# Patient Record
Sex: FEMALE | Race: WHITE | Employment: FULL TIME | URBAN - METROPOLITAN AREA
[De-identification: names, ages, dates, MRNs, and addresses within clinical notes are randomized per-mention and may not be internally consistent; named-entity substitution may affect disease eponyms.]

---

## 2018-05-29 ENCOUNTER — OFFICE VISIT (OUTPATIENT)
Dept: INTERNAL MEDICINE CLINIC | Age: 63
End: 2018-05-29

## 2018-05-29 VITALS
SYSTOLIC BLOOD PRESSURE: 138 MMHG | BODY MASS INDEX: 31.52 KG/M2 | TEMPERATURE: 97.7 F | OXYGEN SATURATION: 99 % | DIASTOLIC BLOOD PRESSURE: 88 MMHG | HEART RATE: 86 BPM | HEIGHT: 67 IN | WEIGHT: 200.8 LBS

## 2018-05-29 DIAGNOSIS — N39.0 URINARY TRACT INFECTION WITH HEMATURIA, SITE UNSPECIFIED: ICD-10-CM

## 2018-05-29 DIAGNOSIS — R30.0 DYSURIA: Primary | ICD-10-CM

## 2018-05-29 DIAGNOSIS — R31.9 URINARY TRACT INFECTION WITH HEMATURIA, SITE UNSPECIFIED: ICD-10-CM

## 2018-05-29 LAB
BILIRUBIN, POC: NORMAL
BLOOD URINE, POC: NORMAL
CLARITY, POC: NORMAL
COLOR, POC: YELLOW
GLUCOSE URINE, POC: NORMAL
KETONES, POC: NORMAL
LEUKOCYTE EST, POC: NORMAL
NITRITE, POC: NORMAL
PH, POC: 6
PROTEIN, POC: NORMAL
SPECIFIC GRAVITY, POC: 1.01
UROBILINOGEN, POC: 0.2

## 2018-05-29 PROCEDURE — 81002 URINALYSIS NONAUTO W/O SCOPE: CPT | Performed by: NURSE PRACTITIONER

## 2018-05-29 PROCEDURE — 99213 OFFICE O/P EST LOW 20 MIN: CPT | Performed by: NURSE PRACTITIONER

## 2018-05-29 RX ORDER — PHENAZOPYRIDINE HYDROCHLORIDE 100 MG/1
100 TABLET, FILM COATED ORAL 3 TIMES DAILY PRN
Qty: 9 TABLET | Refills: 0 | Status: SHIPPED | OUTPATIENT
Start: 2018-05-29 | End: 2019-05-29

## 2018-05-29 RX ORDER — CIPROFLOXACIN 500 MG/1
500 TABLET, FILM COATED ORAL 2 TIMES DAILY
Qty: 14 TABLET | Refills: 0 | Status: SHIPPED | OUTPATIENT
Start: 2018-05-29 | End: 2018-06-05

## 2018-05-29 ASSESSMENT — PATIENT HEALTH QUESTIONNAIRE - PHQ9
1. LITTLE INTEREST OR PLEASURE IN DOING THINGS: 0
SUM OF ALL RESPONSES TO PHQ9 QUESTIONS 1 & 2: 0
2. FEELING DOWN, DEPRESSED OR HOPELESS: 0
SUM OF ALL RESPONSES TO PHQ QUESTIONS 1-9: 0

## 2018-05-29 ASSESSMENT — ENCOUNTER SYMPTOMS
NAUSEA: 0
DIARRHEA: 0
ABDOMINAL PAIN: 0
COUGH: 0
SHORTNESS OF BREATH: 0
VOMITING: 0

## 2018-05-31 ENCOUNTER — TELEPHONE (OUTPATIENT)
Dept: INTERNAL MEDICINE CLINIC | Age: 63
End: 2018-05-31

## 2018-05-31 LAB
ORGANISM: ABNORMAL
URINE CULTURE, ROUTINE: ABNORMAL
URINE CULTURE, ROUTINE: ABNORMAL

## 2021-11-15 ENCOUNTER — OFFICE VISIT (OUTPATIENT)
Dept: FAMILY MEDICINE CLINIC | Age: 66
End: 2021-11-15
Payer: COMMERCIAL

## 2021-11-15 VITALS
OXYGEN SATURATION: 100 % | DIASTOLIC BLOOD PRESSURE: 80 MMHG | HEART RATE: 67 BPM | SYSTOLIC BLOOD PRESSURE: 138 MMHG | WEIGHT: 201 LBS | TEMPERATURE: 98.9 F | BODY MASS INDEX: 31.55 KG/M2 | HEIGHT: 67 IN

## 2021-11-15 DIAGNOSIS — E04.1 THYROID NODULE: ICD-10-CM

## 2021-11-15 DIAGNOSIS — R05.9 COUGH: ICD-10-CM

## 2021-11-15 DIAGNOSIS — Z12.11 SCREENING FOR COLON CANCER: ICD-10-CM

## 2021-11-15 DIAGNOSIS — R63.5 WEIGHT GAIN: Primary | ICD-10-CM

## 2021-11-15 PROBLEM — M67.471 GANGLION OF FOOT, RIGHT: Status: ACTIVE | Noted: 2021-11-15

## 2021-11-15 LAB
A/G RATIO: 1.8 (ref 1.1–2.2)
ALBUMIN SERPL-MCNC: 4.8 G/DL (ref 3.4–5)
ALP BLD-CCNC: 101 U/L (ref 40–129)
ALT SERPL-CCNC: 19 U/L (ref 10–40)
ANION GAP SERPL CALCULATED.3IONS-SCNC: 11 MMOL/L (ref 3–16)
AST SERPL-CCNC: 16 U/L (ref 15–37)
BILIRUB SERPL-MCNC: 0.6 MG/DL (ref 0–1)
BUN BLDV-MCNC: 12 MG/DL (ref 7–20)
CALCIUM SERPL-MCNC: 10.9 MG/DL (ref 8.3–10.6)
CHLORIDE BLD-SCNC: 102 MMOL/L (ref 99–110)
CHOLESTEROL, TOTAL: 216 MG/DL (ref 0–199)
CO2: 26 MMOL/L (ref 21–32)
CREAT SERPL-MCNC: 0.6 MG/DL (ref 0.6–1.2)
GFR AFRICAN AMERICAN: >60
GFR NON-AFRICAN AMERICAN: >60
GLUCOSE BLD-MCNC: 106 MG/DL (ref 70–99)
HDLC SERPL-MCNC: 74 MG/DL (ref 40–60)
LDL CHOLESTEROL CALCULATED: 129 MG/DL
POTASSIUM SERPL-SCNC: 4.5 MMOL/L (ref 3.5–5.1)
SODIUM BLD-SCNC: 139 MMOL/L (ref 136–145)
T3 FREE: 2.9 PG/ML (ref 2.3–4.2)
T4 FREE: 1.2 NG/DL (ref 0.9–1.8)
TOTAL PROTEIN: 7.5 G/DL (ref 6.4–8.2)
TRIGL SERPL-MCNC: 67 MG/DL (ref 0–150)
TSH SERPL DL<=0.05 MIU/L-ACNC: 2.62 UIU/ML (ref 0.27–4.2)
VITAMIN D 25-HYDROXY: 74.1 NG/ML
VLDLC SERPL CALC-MCNC: 13 MG/DL

## 2021-11-15 PROCEDURE — 99203 OFFICE O/P NEW LOW 30 MIN: CPT | Performed by: FAMILY MEDICINE

## 2021-11-15 RX ORDER — BENZONATATE 100 MG/1
100 CAPSULE ORAL 3 TIMES DAILY PRN
Qty: 30 CAPSULE | Refills: 1 | Status: SHIPPED | OUTPATIENT
Start: 2021-11-15 | End: 2021-12-15

## 2021-11-15 RX ORDER — ASCORBIC ACID 500 MG
500 TABLET ORAL DAILY
COMMUNITY

## 2021-11-15 RX ORDER — VIT C/B6/B5/MAGNESIUM/HERB 173 50-5-6-5MG
CAPSULE ORAL DAILY
COMMUNITY

## 2021-11-15 ASSESSMENT — PATIENT HEALTH QUESTIONNAIRE - PHQ9
SUM OF ALL RESPONSES TO PHQ QUESTIONS 1-9: 0
1. LITTLE INTEREST OR PLEASURE IN DOING THINGS: 0
SUM OF ALL RESPONSES TO PHQ QUESTIONS 1-9: 0
SUM OF ALL RESPONSES TO PHQ9 QUESTIONS 1 & 2: 0
2. FEELING DOWN, DEPRESSED OR HOPELESS: 0
SUM OF ALL RESPONSES TO PHQ QUESTIONS 1-9: 0

## 2021-11-15 NOTE — PROGRESS NOTES
A/P: She is healing well from recent fall. A 2 cm thyroid nodule was found incidentally on CT scan done in the ER. She would like to get some thyroid blood work today. We will proceed with thyroid ultrasound as well. With her weight gain, will check thyroid labs, A1c, CMP, lipid profile (she is fasting today). For her suspected post viral cough, supportive care recommended. Tessalon Perles prescribed. Patient to call or be evaluated if symptoms worsen or fail to improve/resolve. She is due for colon cancer screening fu, she would like to proceed with colonoscopy. GI consult ordered. Follow-up in 12 months or sooner if needed. O:   /80   Pulse 67   Temp 98.9 °F (37.2 °C)   Ht 5' 7\" (1.702 m)   Wt 201 lb (91.2 kg)   LMP  (LMP Unknown)   SpO2 100%   Breastfeeding No   BMI 31.48 kg/m²   Gen- NAD, pleasant  Skin-nicely healing laceration of right forehead  HEENT- Eyes without icterus or injection, throat and tms unremarkable  Neck- Supple, no lymphadenopathy appreciated  Lungs- CTAB  Heart- RRR  Abd- Soft, non tender  Ext- No edema  Psych- Appropriate    S: CC-establish with new provider, new patient  Nida Rosales presents to establish as new patient. She had a fall a few weeks ago at [facility] due to tripping over someone's foot. She needed over 20 sutures of her forehead. She denies any problems with her balance, walking at baseline. She would like to get some baseline blood work today. She reports she has gained quite a bit of weight with Covid. She reports that she has a resolving URI with persistent cough x 10 days. Her postnasal drip has improved. There is no chest pain, dyspnea, fever associated.     ROS  Denies fever, chills, night sweats  Denies headaches, sore throat, ear pain  Denies chest pain, dyspnea, palpitations  Denies nausea, vomiting, diarrhea  Denies joint pain  Denies depression, anxiety  Denies rashes    Current Outpatient Medications   Medication Sig Dispense Refill    VITAMIN D-VITAMIN K PO Take by mouth      ascorbic acid (VITAMIN C) 500 MG tablet Take 500 mg by mouth daily      turmeric 500 MG CAPS Take by mouth daily      Acetylcysteine ( PO) Take by mouth      Zinc 15 MG CAPS Take by mouth      benzonatate (TESSALON PERLES) 100 MG capsule Take 1 capsule by mouth 3 times daily as needed for Cough 30 capsule 1    aspirin 81 MG tablet Take 81 mg by mouth daily      Multiple Vitamins-Minerals (THERAPEUTIC MULTIVITAMIN-MINERALS) tablet Take 1 tablet by mouth daily      Cyanocobalamin (VITAMIN B-12) 3000 MCG SUBL Place 3,000 mcg under the tongue daily      Coenzyme Q10 (COQ10) 100 MG CAPS Take 100 mg by mouth daily      Saccharomyces boulardii (FLORASTOR PO) Take 250 mg by mouth daily       No current facility-administered medications for this visit.         Allergies   Allergen Reactions    Other     Penicillins Hives and Swelling        Past Medical History:   Diagnosis Date    Cervical arthritis     Fall     tripped at [facility], over 20 stitches, hit steel    Right thyroid nodule         Past Surgical History:   Procedure Laterality Date    CHOLECYSTECTOMY      FACIAL COSMETIC SURGERY  2021    26 stitches on face    FOOT SURGERY Right     nodule removed    PLANTAR FASCIA SURGERY      right        Social History     Social History Narrative    , 3 children, 2 children, likes to spend time with family,  [facility] [employer] [facility] [employer] [facility] [employer] [address] History   Problem Relation Age of Onset    Lung Cancer Mother     Other Father         pancreatic cancer          Wilbert Johnson,

## 2021-11-19 ENCOUNTER — HOSPITAL ENCOUNTER (OUTPATIENT)
Dept: ULTRASOUND IMAGING | Age: 66
Discharge: HOME | End: 2021-11-19
Payer: COMMERCIAL

## 2021-11-19 DIAGNOSIS — E04.1 THYROID NODULE: ICD-10-CM

## 2021-11-19 PROCEDURE — 76536 US EXAM OF HEAD AND NECK: CPT

## 2021-11-22 ENCOUNTER — TELEPHONE (OUTPATIENT)
Dept: FAMILY MEDICINE CLINIC | Age: 66
End: 2021-11-22

## 2021-11-22 DIAGNOSIS — E83.52 HYPERCALCEMIA: Primary | ICD-10-CM

## 2021-11-22 NOTE — TELEPHONE ENCOUNTER
Please let pt know that her thyroid ultrasound does show a nodule that a sample should be taken from to make sure it is benign--- very often it is. I suspect a referral to general surgery for this would expedite the process rather than going the endocrine route. Would she be ok with this referral?    Her calcium level is a bit high. I would like to re-check this level in 2 to 4 weeks to make sure it has normalized. She can make a lab only appointment for this. I have placed order Her bad cholesterol level is mildly elevated, but she does have a very nice protective cholesterol level. Her sugar is a bit elevated. I would recommend the Mediterranean diet to help with her mild bad cholesterol and sugar levels.

## 2021-11-23 NOTE — TELEPHONE ENCOUNTER
Pt called in and I advised her of all of Dr. Roylene Goodell notes. Pt stated she would like a referral to a general surgeon Dr. Deborah Sarmiento if possible and would like a phone call once referral is placed. [phone].  Pt also made a f/u lab appointment to check calcium

## 2021-11-26 DIAGNOSIS — E04.1 THYROID NODULE: Primary | ICD-10-CM

## 2021-11-29 ENCOUNTER — TELEPHONE (OUTPATIENT)
Dept: SURGERY | Age: 66
End: 2021-11-29

## 2021-11-29 NOTE — TELEPHONE ENCOUNTER
As per Dr. Hagen Morning should call Dr. Kaylin Bauer, phone [phone] to schedule a biopsy.      She said she will call her PCP and ask them to place a new referral.

## 2021-12-02 ENCOUNTER — TELEPHONE (OUTPATIENT)
Dept: FAMILY MEDICINE CLINIC | Age: 66
End: 2021-12-02

## 2021-12-02 DIAGNOSIS — E04.1 THYROID NODULE: Primary | ICD-10-CM

## 2021-12-02 NOTE — TELEPHONE ENCOUNTER
----- Message from Dilan Odom sent at 12/1/2021  9:56 AM EST -----  Subject: Referral Request    QUESTIONS   Reason for referral request? Pt needs new referral for her thyroid biopsy. Previous referral no longer does them. Has the physician seen you for this condition before? Yes  Select a date? 2021-12-01  Select the Provider the patient wants to be referred to, if known (PCP or   Specialist)? Yomaira Cabrera   Preferred Specialist (if applicable)? Do you already have an appointment scheduled? No  Additional Information for Provider?   ---------------------------------------------------------------------------  --------------  CALL BACK INFO  What is the best way for the office to contact you? OK to leave message on   voicemail  Preferred Call Back Phone Number?  [phone]

## 2021-12-08 ENCOUNTER — TELEPHONE (OUTPATIENT)
Dept: ENT CLINIC | Age: 66
End: 2021-12-08

## 2021-12-08 ENCOUNTER — OFFICE VISIT (OUTPATIENT)
Dept: ENT CLINIC | Age: 66
End: 2021-12-08
Payer: COMMERCIAL

## 2021-12-08 VITALS
HEART RATE: 76 BPM | DIASTOLIC BLOOD PRESSURE: 90 MMHG | WEIGHT: 196 LBS | SYSTOLIC BLOOD PRESSURE: 136 MMHG | BODY MASS INDEX: 30.7 KG/M2

## 2021-12-08 DIAGNOSIS — E04.1 THYROID NODULE: Primary | ICD-10-CM

## 2021-12-08 DIAGNOSIS — H61.22 IMPACTED CERUMEN OF LEFT EAR: ICD-10-CM

## 2021-12-08 PROCEDURE — 99203 OFFICE O/P NEW LOW 30 MIN: CPT | Performed by: OTOLARYNGOLOGY

## 2021-12-08 PROCEDURE — 69210 REMOVE IMPACTED EAR WAX UNI: CPT | Performed by: OTOLARYNGOLOGY

## 2021-12-08 NOTE — TELEPHONE ENCOUNTER
Central Scheduling calling about pt's thyroid biopsy; bloodwork is needed prior to this being done. She needs \"PT, PTT, IRN and platelets\". This needs to be stat; pt is scheduled for this Monday, 12/13/21.

## 2021-12-08 NOTE — PROGRESS NOTES
[facility]      Patient Name: [facility] Record Number:  <[id number]  Primary Care Physician:  Bradly Romo DO  Date of Consultation: 12/8/2021    Chief Complaint: Thyroid nodule        HISTORY OF PRESENT ILLNESS  Viona Hodgkin is a(n) 73 y.o. female who presents for evaluation of a thyroid nodule. The patient got an accident at [facility] in October and sustained a large laceration of the forehead. She was evaluated with a CT scan of the head and cervical spine at that time which revealed a right-sided thyroid nodule. Since that time she has gotten a thyroid ultrasound. She has no personal history of thyroid issues. She has no family history of thyroid cancer. She has no personal history of radiation exposure. She denies compressive symptoms. Denies trouble with her ears. REVIEW OF SYSTEMS  As above    PHYSICAL EXAM  GENERAL: No Acute Distress, Alert and Oriented, no Hoarseness, strong voice  EYES: EOMI, Anti-icteric  HENT:   Head: Normocephalic and atraumatic. Face:  Symmetric, facial nerve intact, no sinus tenderness  Right Ear: Normal external ear, normal external auditory canal, intact tympanic membrane with normal mobility and aerated middle ear  Left Ear: Completely impacted ear canal with wax  Mouth/Oral Cavity:  normal lips, Uvula is midline, no mucosal lesions,   Oropharynx/Larynx:  normal oropharynx  Nose:Normal external nasal appearance. NECK: There seems to be a firm nodule adjacent to the trachea on the right side. Somewhat difficult to feel        RADIOLOGY  Summary of findings:  Thyroid ultrasound showed a 2.9 cm right thyroid nodule meeting criteria for an FNA      PROCEDURE  Left ear exam with cerumen removal  Ears visualized binoculars scope. Denser and impaction was removed with a right angle. Tympanic membrane intact with an aerated middle ear        ASSESSMENT/PLAN  1.  Thyroid nodule  I have ordered an ultrasound-guided biopsy. Told patient I will call her with results. I told her specifically speaking this is more likely to be benign. Also told her that there is a chance it would need to be sent off for genetic testing which can take a few weeks. - US BIOPSY THYROID; Future    2. Impacted cerumen of left ear  I removed this today in clinic. I have performed a head and neck physical exam personally or was physically present during the key or critical portions of the service. This note was generated completely or in part utilizing Dragon dictation speech recognition software. Occasionally, words are mistranscribed and despite editing, the text may contain inaccuracies due to incorrect word recognition. If further clarification is needed please contact the office at [phone].

## 2021-12-13 ENCOUNTER — HOSPITAL ENCOUNTER (OUTPATIENT)
Dept: ULTRASOUND IMAGING | Age: 66
Discharge: HOME | End: 2021-12-13
Payer: MEDICARE

## 2021-12-13 DIAGNOSIS — E04.1 THYROID NODULE: ICD-10-CM

## 2021-12-13 PROCEDURE — 60100 BIOPSY OF THYROID: CPT

## 2021-12-13 PROCEDURE — 88305 TISSUE EXAM BY PATHOLOGIST: CPT

## 2021-12-13 PROCEDURE — 88173 CYTOPATH EVAL FNA REPORT: CPT

## 2021-12-13 NOTE — BRIEF OP NOTE
Brief Postoperative Note    Leydi Handley  YOB: 1955  [id number]    Pre-operative Diagnosis: right thyroid nodule    Post-operative Diagnosis: Same    Procedure: US-guided right thyroid nodule FNA    Anesthesia: Local    Surgeons: Stuart Celestin MD    Estimated Blood Loss: Less than 5 mL    Complications: None    Specimens: Was Obtained: 3 FNA specimens    Findings: Successful US-guided right thyroid nodule FNA.     Electronically signed by Stuart Celestin MD on 12/13/2021 at 7:57 AM

## 2021-12-15 ENCOUNTER — NURSE ONLY (OUTPATIENT)
Dept: FAMILY MEDICINE CLINIC | Age: 66
End: 2021-12-15
Payer: MEDICARE

## 2021-12-15 ENCOUNTER — TELEPHONE (OUTPATIENT)
Dept: ENT CLINIC | Age: 66
End: 2021-12-15

## 2021-12-15 DIAGNOSIS — E83.52 HYPERCALCEMIA: ICD-10-CM

## 2021-12-15 LAB
A/G RATIO: 1.5 (ref 1.1–2.2)
ALBUMIN SERPL-MCNC: 4.3 G/DL (ref 3.4–5)
ALP BLD-CCNC: 90 U/L (ref 40–129)
ALT SERPL-CCNC: 22 U/L (ref 10–40)
ANION GAP SERPL CALCULATED.3IONS-SCNC: 12 MMOL/L (ref 3–16)
AST SERPL-CCNC: 27 U/L (ref 15–37)
BILIRUB SERPL-MCNC: 0.3 MG/DL (ref 0–1)
BUN BLDV-MCNC: 11 MG/DL (ref 7–20)
CALCIUM SERPL-MCNC: 9.1 MG/DL (ref 8.3–10.6)
CHLORIDE BLD-SCNC: 98 MMOL/L (ref 99–110)
CO2: 25 MMOL/L (ref 21–32)
CREAT SERPL-MCNC: 0.8 MG/DL (ref 0.6–1.2)
GFR AFRICAN AMERICAN: >60
GFR NON-AFRICAN AMERICAN: >60
GLUCOSE BLD-MCNC: 105 MG/DL (ref 70–99)
PARATHYROID HORMONE INTACT: 46.9 PG/ML (ref 14–72)
POTASSIUM SERPL-SCNC: 4.4 MMOL/L (ref 3.5–5.1)
SODIUM BLD-SCNC: 135 MMOL/L (ref 136–145)
TOTAL PROTEIN: 7.1 G/DL (ref 6.4–8.2)

## 2021-12-15 NOTE — TELEPHONE ENCOUNTER
I called the patient to let her know that her FNA thyroid biopsy was benign. I would suggest for her to get another ultrasound in a couple years to make sure is not getting significantly larger in a hurry. Otherwise we will need to do anything unless she develops compressive symptoms.

## 2022-01-26 ENCOUNTER — TELEPHONE (OUTPATIENT)
Dept: FAMILY MEDICINE CLINIC | Age: 67
End: 2022-01-26

## 2022-09-29 ENCOUNTER — TELEPHONE (OUTPATIENT)
Dept: FAMILY MEDICINE CLINIC | Age: 67
End: 2022-09-29

## 2022-12-16 ENCOUNTER — TELEMEDICINE (OUTPATIENT)
Dept: FAMILY MEDICINE CLINIC | Age: 67
End: 2022-12-16
Payer: MEDICARE

## 2022-12-16 DIAGNOSIS — Z12.11 COLON CANCER SCREENING: ICD-10-CM

## 2022-12-16 DIAGNOSIS — Z00.00 MEDICARE ANNUAL WELLNESS VISIT, SUBSEQUENT: Primary | ICD-10-CM

## 2022-12-16 PROCEDURE — 1123F ACP DISCUSS/DSCN MKR DOCD: CPT | Performed by: FAMILY MEDICINE

## 2022-12-16 PROCEDURE — G0439 PPPS, SUBSEQ VISIT: HCPCS | Performed by: FAMILY MEDICINE

## 2022-12-16 SDOH — ECONOMIC STABILITY: FOOD INSECURITY: WITHIN THE PAST 12 MONTHS, YOU WORRIED THAT YOUR FOOD WOULD RUN OUT BEFORE YOU GOT MONEY TO BUY MORE.: NEVER TRUE

## 2022-12-16 SDOH — ECONOMIC STABILITY: FOOD INSECURITY: WITHIN THE PAST 12 MONTHS, THE FOOD YOU BOUGHT JUST DIDN'T LAST AND YOU DIDN'T HAVE MONEY TO GET MORE.: NEVER TRUE

## 2022-12-16 ASSESSMENT — PATIENT HEALTH QUESTIONNAIRE - PHQ9
SUM OF ALL RESPONSES TO PHQ QUESTIONS 1-9: 0
2. FEELING DOWN, DEPRESSED OR HOPELESS: 0
SUM OF ALL RESPONSES TO PHQ QUESTIONS 1-9: 0
SUM OF ALL RESPONSES TO PHQ QUESTIONS 1-9: 0
SUM OF ALL RESPONSES TO PHQ9 QUESTIONS 1 & 2: 0
SUM OF ALL RESPONSES TO PHQ QUESTIONS 1-9: 0
1. LITTLE INTEREST OR PLEASURE IN DOING THINGS: 0

## 2022-12-16 ASSESSMENT — LIFESTYLE VARIABLES
HOW MANY STANDARD DRINKS CONTAINING ALCOHOL DO YOU HAVE ON A TYPICAL DAY: 1 OR 2
HOW OFTEN DO YOU HAVE A DRINK CONTAINING ALCOHOL: 2-4 TIMES A MONTH

## 2022-12-16 ASSESSMENT — SOCIAL DETERMINANTS OF HEALTH (SDOH): HOW HARD IS IT FOR YOU TO PAY FOR THE VERY BASICS LIKE FOOD, HOUSING, MEDICAL CARE, AND HEATING?: NOT HARD AT ALL

## 2022-12-16 NOTE — PROGRESS NOTES
Medicare Annual Wellness Visit    Yessy Weber is here for Medicare AWV    Assessment & Plan   Medicare annual wellness visit, subsequent  Colon cancer screening  - Fecal DNA Colorectal cancer screening (Cologuard)--- colon cancer screening options discussed, she is asymptomatic without family history of colon cancer, she denies history of any abnormal colonoscopy, she wishes to proceed with Cologuard      To call and make appointment with gynecology and get DEXA scan and mammograms through her gynecologist    Recommendations for Preventive Services Due: see orders and patient instructions/AVS.  Recommended screening schedule for the next 5-10 years is provided to the patient in written form: see Patient Instructions/AVS.     Follow-up in 12 months or sooner if needed     Subjective   The following acute and/or chronic problems were also addressed today:  Doing well, no concerns    Patient sees dentist, eye doctor    Patient's complete Health Risk Assessment and screening values have been reviewed and are found in [address] [facility] [address]. The following problems were reviewed today and where indicated follow up appointments were made and/or referrals ordered.     Positive Risk Factor Screenings with Interventions:                 Weight and Activity:  Physical Activity: Insufficiently Active    Days of Exercise per Week: 3 days    Minutes of Exercise per Session: 40 min     On average, how many days per week do you engage in moderate to strenuous exercise (like a brisk walk)?: 3 days  Have you lost any weight without trying in the past 3 months?: No     Importance of advanced directives discussed  Healthy living discussed, she will work on increasing her exercise  Avoidance of fall hazards discussed          Objective      Patient-Reported Vitals  BP Observations: No, remote/electronic monitoring device was not used or able to be verified  Patient-Reported Weight: 203 lb  Patient-Reported Height: 5 7     none       Allergies Allergen Reactions    Other     Penicillins Hives and Swelling     Prior to Visit Medications    Medication Sig Taking? Authorizing Provider   Magnesium-Zinc (MAGNESIUM-CHELATED ZINC) 133.33-5 MG TABS  Yes Historical Provider, MD   VITAMIN D-VITAMIN K PO Take by mouth Yes Historical Provider, MD   ascorbic acid (VITAMIN C) 500 MG tablet Take 500 mg by mouth daily Yes Historical Provider, MD   turmeric 500 MG CAPS Take by mouth daily Yes Historical Provider, MD   Acetylcysteine ( PO) Take by mouth Yes Historical Provider, MD   Zinc 15 MG CAPS Take by mouth Yes Historical Provider, MD   aspirin 81 MG tablet Take 81 mg by mouth daily Yes Historical Provider, MD   Multiple Vitamins-Minerals (THERAPEUTIC MULTIVITAMIN-MINERALS) tablet Take 1 tablet by mouth daily Yes Historical Provider, MD   Cyanocobalamin (VITAMIN B-12) 3000 MCG SUBL Place 3,000 mcg under the tongue daily Yes Historical Provider, MD   Coenzyme Q10 (COQ10) 100 MG CAPS Take 100 mg by mouth daily Yes Historical Provider, MD   Saccharomyces boulardii (FLORASTOR PO) Take 250 mg by mouth daily Yes Historical Provider, MD [place] (Including outside providers/suppliers regularly involved in providing care):   Patient Care Team:  Billy Quijano DO as PCP - General (Family Medicine)  Billy Quijano DO as PCP - [facility] Empaneled Provider     Reviewed and updated this visit:  Allergies  Meds              Gregory Seeds, was evaluated through a synchronous (real-time) audio-video encounter. The patient (or guardian if applicable) is aware that this is a billable service, which includes applicable co-pays. This Virtual Visit was conducted with patient's (and/or legal guardian's) consent. The visit was conducted pursuant to the emergency declaration under the [facility], [address] [place] [address] [facility] authority and the [employer] and [employer] General Act.   Patient identification was verified, and a caregiver was present when appropriate. The patient was located at Home: [address] [place] [address]. Provider was located at [place] [address] (Appt Dept): [place] [phone].

## 2023-01-09 ENCOUNTER — PATIENT MESSAGE (OUTPATIENT)
Dept: FAMILY MEDICINE CLINIC | Age: 68
End: 2023-01-09

## 2023-01-09 DIAGNOSIS — C43.9 MALIGNANT MELANOMA, UNSPECIFIED SITE (HCC): ICD-10-CM

## 2023-01-09 DIAGNOSIS — Z86.39 HISTORY OF VITAMIN D DEFICIENCY: ICD-10-CM

## 2023-01-09 DIAGNOSIS — E78.49 OTHER HYPERLIPIDEMIA: Primary | ICD-10-CM

## 2023-01-13 PROBLEM — C43.9 MALIGNANT MELANOMA (HCC): Status: ACTIVE | Noted: 2023-01-13

## 2023-01-13 PROBLEM — Z86.39 HISTORY OF VITAMIN D DEFICIENCY: Status: ACTIVE | Noted: 2023-01-13

## 2023-01-13 NOTE — TELEPHONE ENCOUNTER
Pt is calling to see when she can come and get the blood work completed. There are no orders in the chart. Please advise.

## 2023-01-16 ENCOUNTER — NURSE ONLY (OUTPATIENT)
Dept: FAMILY MEDICINE CLINIC | Age: 68
End: 2023-01-16
Payer: MEDICARE

## 2023-01-16 DIAGNOSIS — Z86.39 HISTORY OF VITAMIN D DEFICIENCY: ICD-10-CM

## 2023-01-16 DIAGNOSIS — E78.49 OTHER HYPERLIPIDEMIA: ICD-10-CM

## 2023-01-16 DIAGNOSIS — C43.9 MALIGNANT MELANOMA, UNSPECIFIED SITE (HCC): ICD-10-CM

## 2023-01-16 LAB
A/G RATIO: 1.5 (ref 1.1–2.2)
ALBUMIN SERPL-MCNC: 4.5 G/DL (ref 3.4–5)
ALP BLD-CCNC: 103 U/L (ref 40–129)
ALT SERPL-CCNC: 17 U/L (ref 10–40)
ANION GAP SERPL CALCULATED.3IONS-SCNC: 15 MMOL/L (ref 3–16)
AST SERPL-CCNC: 19 U/L (ref 15–37)
BASOPHILS ABSOLUTE: 0 K/UL (ref 0–0.2)
BASOPHILS RELATIVE PERCENT: 0.2 %
BILIRUB SERPL-MCNC: 0.7 MG/DL (ref 0–1)
BUN BLDV-MCNC: 18 MG/DL (ref 7–20)
CALCIUM SERPL-MCNC: 11 MG/DL (ref 8.3–10.6)
CHLORIDE BLD-SCNC: 99 MMOL/L (ref 99–110)
CHOLESTEROL, TOTAL: 207 MG/DL (ref 0–199)
CO2: 23 MMOL/L (ref 21–32)
CREAT SERPL-MCNC: 0.8 MG/DL (ref 0.6–1.2)
EOSINOPHILS ABSOLUTE: 0.2 K/UL (ref 0–0.6)
EOSINOPHILS RELATIVE PERCENT: 2.9 %
ESTIMATED AVERAGE GLUCOSE: 122.6 MG/DL
GFR SERPL CREATININE-BSD FRML MDRD: >60 ML/MIN/{1.73_M2}
GLUCOSE BLD-MCNC: 99 MG/DL (ref 70–99)
HBA1C MFR BLD: 5.9 %
HCT VFR BLD CALC: 42.7 % (ref 36–48)
HDLC SERPL-MCNC: 71 MG/DL (ref 40–60)
HEMOGLOBIN: 13.8 G/DL (ref 12–16)
LDL CHOLESTEROL CALCULATED: 122 MG/DL
LYMPHOCYTES ABSOLUTE: 1.8 K/UL (ref 1–5.1)
LYMPHOCYTES RELATIVE PERCENT: 33.8 %
MCH RBC QN AUTO: 29.5 PG (ref 26–34)
MCHC RBC AUTO-ENTMCNC: 32.3 G/DL (ref 31–36)
MCV RBC AUTO: 91.4 FL (ref 80–100)
MONOCYTES ABSOLUTE: 0.4 K/UL (ref 0–1.3)
MONOCYTES RELATIVE PERCENT: 7.4 %
NEUTROPHILS ABSOLUTE: 2.9 K/UL (ref 1.7–7.7)
NEUTROPHILS RELATIVE PERCENT: 55.7 %
PDW BLD-RTO: 14.2 % (ref 12.4–15.4)
PLATELET # BLD: 332 K/UL (ref 135–450)
PMV BLD AUTO: 8.7 FL (ref 5–10.5)
POTASSIUM SERPL-SCNC: 5.2 MMOL/L (ref 3.5–5.1)
RBC # BLD: 4.68 M/UL (ref 4–5.2)
SODIUM BLD-SCNC: 137 MMOL/L (ref 136–145)
TOTAL PROTEIN: 7.6 G/DL (ref 6.4–8.2)
TRIGL SERPL-MCNC: 71 MG/DL (ref 0–150)
TSH SERPL DL<=0.05 MIU/L-ACNC: 2.61 UIU/ML (ref 0.27–4.2)
VITAMIN D 25-HYDROXY: 96 NG/ML
VLDLC SERPL CALC-MCNC: 14 MG/DL
WBC # BLD: 5.3 K/UL (ref 4–11)

## 2023-01-16 PROCEDURE — 36415 COLL VENOUS BLD VENIPUNCTURE: CPT | Performed by: FAMILY MEDICINE

## 2023-01-26 ENCOUNTER — TELEPHONE (OUTPATIENT)
Dept: FAMILY MEDICINE CLINIC | Age: 68
End: 2023-01-26

## 2023-01-26 DIAGNOSIS — R19.5 POSITIVE COLORECTAL CANCER SCREENING USING COLOGUARD TEST: Primary | ICD-10-CM

## 2023-01-26 DIAGNOSIS — E83.52 HYPERCALCEMIA: ICD-10-CM

## 2023-01-26 NOTE — TELEPHONE ENCOUNTER
Please let patient know her Cologuard test, colon cancer screening test, was positive. Due to this, she should have a colonoscopy. If she okay with referral to GI to take a closer look? Blood result wise, her bad cholesterol level is mildly elevated, but she has a great protective cholesterol level. Her A1c, 3-month sugar average, is in the early prediabetic range. I would recommend continuing to work on diet and exercise to help this. Her thyroid level is in normal range. Her blood counts are in the normal range. She does have a slightly elevated calcium level. I would like to re-check this level in 2 to 4 weeks to make sure it has normalized. If it is still elevated, we should do some further testing. If she has any questions or concerns, let me know, and I will give her a call.

## 2023-01-27 NOTE — TELEPHONE ENCOUNTER
Called and spoke with patient, advised of all test results below. Patient expressed understanding. She will come back in 2/4 week for repeat blood work. Patient has phone number and name of Dr Jill Cherry and will call to schedule.

## 2023-02-07 ENCOUNTER — TELEPHONE (OUTPATIENT)
Dept: FAMILY MEDICINE CLINIC | Age: 68
End: 2023-02-07

## 2023-02-07 NOTE — TELEPHONE ENCOUNTER
I spoke with patient in regards to her cologuard test being positive and to remind her to schedule for a colonoscopy. Patient stated she has an appointment today and was on her way there.

## 2023-10-16 ENCOUNTER — OFFICE VISIT (OUTPATIENT)
Dept: FAMILY MEDICINE CLINIC | Age: 68
End: 2023-10-16
Payer: MEDICARE

## 2023-10-16 VITALS
BODY MASS INDEX: 33.18 KG/M2 | HEART RATE: 56 BPM | DIASTOLIC BLOOD PRESSURE: 82 MMHG | OXYGEN SATURATION: 98 % | SYSTOLIC BLOOD PRESSURE: 126 MMHG | WEIGHT: 211.4 LBS | HEIGHT: 67 IN

## 2023-10-16 DIAGNOSIS — R94.31 NONSPECIFIC ABNORMAL ELECTROCARDIOGRAM (ECG) (EKG): ICD-10-CM

## 2023-10-16 DIAGNOSIS — M92.61 ACQUIRED HAGLUND'S DEFORMITY OF RIGHT HEEL: ICD-10-CM

## 2023-10-16 DIAGNOSIS — Z01.818 PRE-OP EXAMINATION: Primary | ICD-10-CM

## 2023-10-16 LAB
ANION GAP SERPL CALCULATED.3IONS-SCNC: 10 MMOL/L (ref 3–16)
BASOPHILS # BLD: 0 K/UL (ref 0–0.2)
BASOPHILS NFR BLD: 0.2 %
BUN SERPL-MCNC: 13 MG/DL (ref 7–20)
CALCIUM SERPL-MCNC: 10.1 MG/DL (ref 8.3–10.6)
CHLORIDE SERPL-SCNC: 104 MMOL/L (ref 99–110)
CO2 SERPL-SCNC: 25 MMOL/L (ref 21–32)
CREAT SERPL-MCNC: 0.7 MG/DL (ref 0.6–1.2)
DEPRECATED RDW RBC AUTO: 14.1 % (ref 12.4–15.4)
EOSINOPHIL # BLD: 0.2 K/UL (ref 0–0.6)
EOSINOPHIL NFR BLD: 2.7 %
GFR SERPLBLD CREATININE-BSD FMLA CKD-EPI: >60 ML/MIN/{1.73_M2}
GLUCOSE SERPL-MCNC: 103 MG/DL (ref 70–99)
HCT VFR BLD AUTO: 40.3 % (ref 36–48)
HGB BLD-MCNC: 13.6 G/DL (ref 12–16)
LYMPHOCYTES # BLD: 2.1 K/UL (ref 1–5.1)
LYMPHOCYTES NFR BLD: 35.7 %
MCH RBC QN AUTO: 30.6 PG (ref 26–34)
MCHC RBC AUTO-ENTMCNC: 33.6 G/DL (ref 31–36)
MCV RBC AUTO: 91.1 FL (ref 80–100)
MONOCYTES # BLD: 0.5 K/UL (ref 0–1.3)
MONOCYTES NFR BLD: 7.9 %
NEUTROPHILS # BLD: 3.2 K/UL (ref 1.7–7.7)
NEUTROPHILS NFR BLD: 53.5 %
PLATELET # BLD AUTO: 308 K/UL (ref 135–450)
PMV BLD AUTO: 9.1 FL (ref 5–10.5)
POTASSIUM SERPL-SCNC: 5.3 MMOL/L (ref 3.5–5.1)
RBC # BLD AUTO: 4.42 M/UL (ref 4–5.2)
SODIUM SERPL-SCNC: 139 MMOL/L (ref 136–145)
WBC # BLD AUTO: 5.9 K/UL (ref 4–11)

## 2023-10-16 PROCEDURE — 1123F ACP DISCUSS/DSCN MKR DOCD: CPT | Performed by: FAMILY MEDICINE

## 2023-10-16 PROCEDURE — 36415 COLL VENOUS BLD VENIPUNCTURE: CPT | Performed by: FAMILY MEDICINE

## 2023-10-16 PROCEDURE — 99214 OFFICE O/P EST MOD 30 MIN: CPT | Performed by: FAMILY MEDICINE

## 2023-10-16 PROCEDURE — 93000 ELECTROCARDIOGRAM COMPLETE: CPT | Performed by: FAMILY MEDICINE

## 2023-10-16 SDOH — ECONOMIC STABILITY: INCOME INSECURITY: HOW HARD IS IT FOR YOU TO PAY FOR THE VERY BASICS LIKE FOOD, HOUSING, MEDICAL CARE, AND HEATING?: NOT HARD AT ALL

## 2023-10-16 SDOH — ECONOMIC STABILITY: HOUSING INSECURITY
IN THE LAST 12 MONTHS, WAS THERE A TIME WHEN YOU DID NOT HAVE A STEADY PLACE TO SLEEP OR SLEPT IN A SHELTER (INCLUDING NOW)?: NO

## 2023-10-16 SDOH — ECONOMIC STABILITY: FOOD INSECURITY: WITHIN THE PAST 12 MONTHS, YOU WORRIED THAT YOUR FOOD WOULD RUN OUT BEFORE YOU GOT MONEY TO BUY MORE.: NEVER TRUE

## 2023-10-16 SDOH — ECONOMIC STABILITY: FOOD INSECURITY: WITHIN THE PAST 12 MONTHS, THE FOOD YOU BOUGHT JUST DIDN'T LAST AND YOU DIDN'T HAVE MONEY TO GET MORE.: NEVER TRUE

## 2023-10-16 ASSESSMENT — PATIENT HEALTH QUESTIONNAIRE - PHQ9
SUM OF ALL RESPONSES TO PHQ QUESTIONS 1-9: 0
1. LITTLE INTEREST OR PLEASURE IN DOING THINGS: 0
SUM OF ALL RESPONSES TO PHQ9 QUESTIONS 1 & 2: 0
SUM OF ALL RESPONSES TO PHQ QUESTIONS 1-9: 0
2. FEELING DOWN, DEPRESSED OR HOPELESS: 0

## 2023-10-16 NOTE — PROGRESS NOTES
Preoperative Consultation      Jian Samuels       Chief Complaint   Patient presents with    Pre-op Exam     Right foot surgery. [facility]. 11/2/2023. Dr. Kiley Bettencourt. UEV:[id number]       Current Outpatient Medications   Medication Sig Dispense Refill    Magnesium-Zinc (MAGNESIUM-CHELATED ZINC) 133.33-5 MG TABS       ascorbic acid (VITAMIN C) 500 MG tablet Take 1 tablet by mouth daily      Acetylcysteine ( PO) Take by mouth      aspirin 81 MG tablet Take 1 tablet by mouth daily      Multiple Vitamins-Minerals (THERAPEUTIC MULTIVITAMIN-MINERALS) tablet Take 1 tablet by mouth daily      Cyanocobalamin (VITAMIN B-12) 3000 MCG SUBL Place 3,000 mcg under the tongue daily      Saccharomyces boulardii (FLORASTOR PO) Take 250 mg by mouth daily       No current facility-administered medications for this visit. Allergies   Allergen Reactions    Other     Penicillins Hives and Swelling         This patient presents to the office today for a preoperative consultation       Planned anesthesia:  general  Known anesthesia problems: none    Bleeding risk:   low  Personal or FH of DVT/PE:  no     Patient Active Problem List   Diagnosis    Ganglion of foot, right    Malignant melanoma ([address])    History of vitamin D deficiency        Past Medical History:   Diagnosis Date    Cervical arthritis     Fall     tripped at [address] [place] [address] Rd, over 20 stitches, hit steel    Mohit's deformity     right, achilles tendon torn    Plantar fasciitis, right     Right thyroid nodule        Family History   Problem Relation Age of Onset    Lung Cancer Mother     Other Father         pancreatic cancer         Social History     Tobacco Use    Smoking status: Never    Smokeless tobacco: Never   Vaping Use    Vaping Use: Never used   Substance Use Topics    Alcohol use:  Yes     Alcohol/week: 1.0 - 2.0 standard drink of alcohol     Types: 1 - 2 Drinks containing 0.5 oz of alcohol per week     Comment: occasionally    Drug use: Never

## 2023-10-19 ENCOUNTER — TELEPHONE (OUTPATIENT)
Dept: FAMILY MEDICINE CLINIC | Age: 68
End: 2023-10-19

## 2023-10-20 PROBLEM — I44.4 LEFT ANTERIOR FASCICULAR BLOCK (LAFB): Status: ACTIVE | Noted: 2023-10-20

## 2023-10-20 NOTE — TELEPHONE ENCOUNTER
I spoke with Ms. Zarina Palacios with cardiology. Nurses currently in clinic and unavailable. I am requesting confirmation of patient's EKG machine read. She asked that I send message to Reema Eric and it can then be viewed by cardiology nurse pool. She said she would send message as well.

## 2023-10-24 NOTE — TELEPHONE ENCOUNTER
I spoke with Eugene Brafdord this morning about EKG results showing left bundle branch block. We reviewed some possible causes including coronary artery disease, congenital heart disease, valvular abnormalities, and heart failure. I let her know a bundle branch block can have no known cause and that some with left bundle branch block completely normal hearts. She will see cardiology tomorrow for an opinion.

## 2023-10-25 ENCOUNTER — TELEPHONE (OUTPATIENT)
Dept: FAMILY MEDICINE CLINIC | Age: 68
End: 2023-10-25

## 2023-10-25 ENCOUNTER — OFFICE VISIT (OUTPATIENT)
Dept: CARDIOLOGY CLINIC | Age: 68
End: 2023-10-25
Payer: MEDICARE

## 2023-10-25 VITALS
SYSTOLIC BLOOD PRESSURE: 120 MMHG | HEART RATE: 68 BPM | BODY MASS INDEX: 32.96 KG/M2 | DIASTOLIC BLOOD PRESSURE: 80 MMHG | OXYGEN SATURATION: 98 % | HEIGHT: 67 IN | WEIGHT: 210 LBS

## 2023-10-25 DIAGNOSIS — R00.0 TACHYCARDIA: Primary | ICD-10-CM

## 2023-10-25 PROCEDURE — 93000 ELECTROCARDIOGRAM COMPLETE: CPT | Performed by: INTERNAL MEDICINE

## 2023-10-25 PROCEDURE — 99203 OFFICE O/P NEW LOW 30 MIN: CPT | Performed by: INTERNAL MEDICINE

## 2023-10-25 PROCEDURE — 1123F ACP DISCUSS/DSCN MKR DOCD: CPT | Performed by: INTERNAL MEDICINE

## 2023-10-25 NOTE — PROGRESS NOTES
[facility]  Cardiology Consult Note      Arron Quiet  1955, 80 y.o.          CC:               Ricki Sloan, DO:      HPI:   This is a 80 y.o. female here for preoperative cardiac clearance. She is scheduled for foot surgery. The patient is otherwise very healthy has no active heart complaints. Specifically denies any chest pain shortness of breath orthopnea PND palpitations or dizziness. She has no risk factors for CAD. Her functional capacity is normal.      Past Medical History:   Diagnosis Date    Cervical arthritis     Fall     tripped at [facility], over 20 stitches, hit steel    Mohit's deformity     right, achilles tendon torn    Plantar fasciitis, right     Right thyroid nodule       Past Surgical History:   Procedure Laterality Date    CHOLECYSTECTOMY      FACIAL COSMETIC SURGERY  2021    26 stitches on face    FOOT SURGERY Right     nodule removed    HERNIA REPAIR      PLANTAR FASCIA SURGERY      right    US THYROID BIOPSY  12/13/2021    US THYROID BIOPSY 12/13/2021 [facility] ULTRASOUND      Family History   Problem Relation Age of Onset    Lung Cancer Mother     Other Father         pancreatic cancer      Social History     Tobacco Use    Smoking status: Never    Smokeless tobacco: Never   Vaping Use    Vaping Use: Never used   Substance Use Topics    Alcohol use:  Yes     Alcohol/week: 1.0 - 2.0 standard drink of alcohol     Types: 1 - 2 Drinks containing 0.5 oz of alcohol per week     Comment: occasionally    Drug use: Never     Allergies   Allergen Reactions    Other     Penicillins Hives and Swelling         Review of Systems -   Constitutional: Negative for weight gain/loss; malaise, fever  Respiratory: Negative for Asthma;  cough and hemoptysis  Cardiovascular: Negative for palpitations,dizziness   Gastrointestinal: Negative for abd.pain; constipation/diarrhea;    Genitourinary: Negative for stones; hematuria; frequency hesitancy  Integumentt: Negative for rash or

## 2024-01-04 ENCOUNTER — OFFICE VISIT (OUTPATIENT)
Dept: FAMILY MEDICINE CLINIC | Age: 69
End: 2024-01-04
Payer: MEDICARE

## 2024-01-04 VITALS
WEIGHT: 200 LBS | BODY MASS INDEX: 31.39 KG/M2 | DIASTOLIC BLOOD PRESSURE: 80 MMHG | HEIGHT: 67 IN | HEART RATE: 83 BPM | OXYGEN SATURATION: 92 % | SYSTOLIC BLOOD PRESSURE: 110 MMHG

## 2024-01-04 DIAGNOSIS — Z01.818 PREOP EXAMINATION: Primary | ICD-10-CM

## 2024-01-04 DIAGNOSIS — E21.5 PARATHYROID ABNORMALITY (HCC): ICD-10-CM

## 2024-01-04 DIAGNOSIS — E83.52 HYPERCALCEMIA: ICD-10-CM

## 2024-01-04 LAB
25(OH)D3 SERPL-MCNC: 44.5 NG/ML
ANION GAP SERPL CALCULATED.3IONS-SCNC: 8 MMOL/L (ref 3–16)
BUN SERPL-MCNC: 16 MG/DL (ref 7–20)
CALCIUM SERPL-MCNC: 10.6 MG/DL (ref 8.3–10.6)
CHLORIDE SERPL-SCNC: 104 MMOL/L (ref 99–110)
CO2 SERPL-SCNC: 28 MMOL/L (ref 21–32)
CREAT SERPL-MCNC: 0.7 MG/DL (ref 0.6–1.2)
GFR SERPLBLD CREATININE-BSD FMLA CKD-EPI: >60 ML/MIN/{1.73_M2}
GLUCOSE SERPL-MCNC: 104 MG/DL (ref 70–99)
POTASSIUM SERPL-SCNC: 4.8 MMOL/L (ref 3.5–5.1)
PTH-INTACT SERPL-MCNC: 103.2 PG/ML (ref 14–72)
SODIUM SERPL-SCNC: 140 MMOL/L (ref 136–145)

## 2024-01-04 PROCEDURE — 99214 OFFICE O/P EST MOD 30 MIN: CPT | Performed by: FAMILY MEDICINE

## 2024-01-04 PROCEDURE — 1123F ACP DISCUSS/DSCN MKR DOCD: CPT | Performed by: FAMILY MEDICINE

## 2024-01-04 PROCEDURE — 36415 COLL VENOUS BLD VENIPUNCTURE: CPT | Performed by: FAMILY MEDICINE

## 2024-01-04 RX ORDER — PRASTERONE (DHEA) 25 MG
CAPSULE ORAL
COMMUNITY
Start: 2023-09-08

## 2024-01-04 RX ORDER — MELOXICAM 15 MG/1
15 TABLET ORAL DAILY
COMMUNITY
Start: 2023-12-26

## 2024-01-04 ASSESSMENT — PATIENT HEALTH QUESTIONNAIRE - PHQ9
SUM OF ALL RESPONSES TO PHQ QUESTIONS 1-9: 0
2. FEELING DOWN, DEPRESSED OR HOPELESS: 0
SUM OF ALL RESPONSES TO PHQ QUESTIONS 1-9: 0
1. LITTLE INTEREST OR PLEASURE IN DOING THINGS: 0
SUM OF ALL RESPONSES TO PHQ9 QUESTIONS 1 & 2: 0

## 2024-01-10 ENCOUNTER — TELEPHONE (OUTPATIENT)
Dept: FAMILY MEDICINE CLINIC | Age: 69
End: 2024-01-10

## 2024-06-13 ENCOUNTER — TELEPHONE (OUTPATIENT)
Dept: FAMILY MEDICINE CLINIC | Age: 69
End: 2024-06-13

## 2024-06-13 DIAGNOSIS — Z12.31 ENCOUNTER FOR SCREENING MAMMOGRAM FOR MALIGNANT NEOPLASM OF BREAST: Primary | ICD-10-CM

## 2024-06-13 NOTE — TELEPHONE ENCOUNTER
Called pt and informed pt she is due for her mammogram and her awv. Pt states an understanding. Pt is scheduled for her vv awv. Pt wants [facility] with [facility] and needs an order put in please advise.

## 2024-06-14 SDOH — HEALTH STABILITY: PHYSICAL HEALTH: ON AVERAGE, HOW MANY DAYS PER WEEK DO YOU ENGAGE IN MODERATE TO STRENUOUS EXERCISE (LIKE A BRISK WALK)?: 2 DAYS

## 2024-06-14 ASSESSMENT — PATIENT HEALTH QUESTIONNAIRE - PHQ9
SUM OF ALL RESPONSES TO PHQ QUESTIONS 1-9: 0
SUM OF ALL RESPONSES TO PHQ QUESTIONS 1-9: 0
2. FEELING DOWN, DEPRESSED OR HOPELESS: NOT AT ALL
SUM OF ALL RESPONSES TO PHQ QUESTIONS 1-9: 0
SUM OF ALL RESPONSES TO PHQ9 QUESTIONS 1 & 2: 0
1. LITTLE INTEREST OR PLEASURE IN DOING THINGS: NOT AT ALL
SUM OF ALL RESPONSES TO PHQ QUESTIONS 1-9: 0

## 2024-06-14 ASSESSMENT — LIFESTYLE VARIABLES
HOW OFTEN DO YOU HAVE A DRINK CONTAINING ALCOHOL: 2-4 TIMES A MONTH
HOW OFTEN DO YOU HAVE SIX OR MORE DRINKS ON ONE OCCASION: 1
HOW MANY STANDARD DRINKS CONTAINING ALCOHOL DO YOU HAVE ON A TYPICAL DAY: 1 OR 2
HOW MANY STANDARD DRINKS CONTAINING ALCOHOL DO YOU HAVE ON A TYPICAL DAY: 1
HOW OFTEN DO YOU HAVE A DRINK CONTAINING ALCOHOL: 3

## 2024-06-14 NOTE — TELEPHONE ENCOUNTER
Called Sahara, asked regarding pap smear states that lately within the last 2 month that the flap of skin bleeds occasionally just when wiping is a tinge of blood.  Has not had a gyn appt since prior to covid. BEN, will talk to you about Monday during VV.

## 2024-06-17 ENCOUNTER — TELEMEDICINE (OUTPATIENT)
Dept: FAMILY MEDICINE CLINIC | Age: 69
End: 2024-06-17
Payer: MEDICARE

## 2024-06-17 DIAGNOSIS — N95.0 POST-MENOPAUSAL BLEEDING: ICD-10-CM

## 2024-06-17 DIAGNOSIS — Z00.00 ENCOUNTER FOR ANNUAL WELLNESS EXAM IN MEDICARE PATIENT: Primary | ICD-10-CM

## 2024-06-17 DIAGNOSIS — C43.9 MALIGNANT MELANOMA, UNSPECIFIED SITE (HCC): ICD-10-CM

## 2024-06-17 PROCEDURE — G0439 PPPS, SUBSEQ VISIT: HCPCS | Performed by: FAMILY MEDICINE

## 2024-06-17 PROCEDURE — 1123F ACP DISCUSS/DSCN MKR DOCD: CPT | Performed by: FAMILY MEDICINE

## 2024-06-17 RX ORDER — IMIQUIMOD 12.5 MG/.25G
CREAM TOPICAL
COMMUNITY
Start: 2024-01-22

## 2024-06-17 RX ORDER — PERPHENAZINE 4 MG
20 TABLET ORAL DAILY
COMMUNITY

## 2024-06-17 SDOH — ECONOMIC STABILITY: FOOD INSECURITY: WITHIN THE PAST 12 MONTHS, THE FOOD YOU BOUGHT JUST DIDN'T LAST AND YOU DIDN'T HAVE MONEY TO GET MORE.: NEVER TRUE

## 2024-06-17 SDOH — ECONOMIC STABILITY: FOOD INSECURITY: WITHIN THE PAST 12 MONTHS, YOU WORRIED THAT YOUR FOOD WOULD RUN OUT BEFORE YOU GOT MONEY TO BUY MORE.: NEVER TRUE

## 2024-06-17 SDOH — ECONOMIC STABILITY: INCOME INSECURITY: HOW HARD IS IT FOR YOU TO PAY FOR THE VERY BASICS LIKE FOOD, HOUSING, MEDICAL CARE, AND HEATING?: NOT HARD AT ALL

## 2024-06-17 ASSESSMENT — LIFESTYLE VARIABLES
HOW OFTEN DO YOU HAVE A DRINK CONTAINING ALCOHOL: 2-4 TIMES A MONTH
HOW MANY STANDARD DRINKS CONTAINING ALCOHOL DO YOU HAVE ON A TYPICAL DAY: 1 OR 2

## 2024-06-17 ASSESSMENT — ANXIETY QUESTIONNAIRES
4. TROUBLE RELAXING: SEVERAL DAYS
1. FEELING NERVOUS, ANXIOUS, OR ON EDGE: NOT AT ALL
GAD7 TOTAL SCORE: 1
3. WORRYING TOO MUCH ABOUT DIFFERENT THINGS: NOT AT ALL
5. BEING SO RESTLESS THAT IT IS HARD TO SIT STILL: NOT AT ALL
6. BECOMING EASILY ANNOYED OR IRRITABLE: NOT AT ALL
IF YOU CHECKED OFF ANY PROBLEMS ON THIS QUESTIONNAIRE, HOW DIFFICULT HAVE THESE PROBLEMS MADE IT FOR YOU TO DO YOUR WORK, TAKE CARE OF THINGS AT HOME, OR GET ALONG WITH OTHER PEOPLE: NOT DIFFICULT AT ALL
2. NOT BEING ABLE TO STOP OR CONTROL WORRYING: NOT AT ALL

## 2024-06-17 NOTE — PROGRESS NOTES
Virtual Video Note    Sahara Nayak was evaluated through a synchronous (real-time) audio encounter. Patient identification was verified at the start of the visit. She (or guardian if applicable) is aware that this is a billable service, which includes applicable co-pays. This visit was conducted with the patient's (and/or legal guardian's) verbal consent. She has not had a related appointment within my department in the past 7 days or scheduled within the next 24 hours.     The patient was located at Home: [address] [place] [address].  The provider was located at Facility (Appt Dept): [address] [place] [address], [place] [address].    Note: not billable if this call serves to triage the patient into an appointment for the relevant concern  Yes, I confirm.         Medicare Annual Wellness Visit    Sahara Nayak is here for Medicare AWV    Assessment & Plan   Encounter for annual wellness exam in Medicare patient  Malignant melanoma, unspecified site (HCC)--- she will continue following with dermatology  Post-menopausal bleeding  -     AFL - Toya Sawant MD, Gynecology, [facility]    She declines any vaccinations  She is up-to-date with her colon cancer screening, DEXA scan    Recommendations for Preventive Services Due: see orders and patient instructions/AVS.    Recommended screening schedule for the next 5-10 years is provided to the patient in written form: see Patient Instructions/AVS.     Return in about 1 year (around 6/17/2025) for AWV.     Subjective      Patient reports she is recovering slowly from her Mohit's deformity surgery approximately 7 months ago    Patient reports over the past couple months she has noticed what she suspects is bleeding coming from the vagina, she denies discomfort, she reports being menopausal at age 46, denies pain, urinary symptoms    Patient's complete Health Risk Assessment and screening values have been reviewed and are found in

## 2025-07-11 ENCOUNTER — TELEPHONE (OUTPATIENT)
Dept: FAMILY MEDICINE CLINIC | Age: 70
End: 2025-07-11

## 2025-07-11 NOTE — TELEPHONE ENCOUNTER
Pt surg is on the 25th of July. Samara does not have any ntp spots before then. Not sure if [facility] is taking new patients anymore.

## 2025-07-11 NOTE — TELEPHONE ENCOUNTER
Pt called in stating that she is having surgery on the 25th. Pt said that she is needing preop and said that she prefers a female pcp. Pt said that the reason she is needing surgery is due to vaginal bleeding and was told by obgyn that she has polyps that need to be removed. Pt has not yet established with new pcp yet and was needing preop. Pt wanting to know if NP will be able to see her for preop. Pt would like a call back at [phone]

## 2025-07-11 NOTE — TELEPHONE ENCOUNTER
Call and let patient know she needs to establish care before her pre op. Advise patient when she establishes care to ask her obgyn if she can use the exam done at that appt for her pre op and if so, should work out to have her done prior to surgery. She can with either [facility] or I

## 2025-07-11 NOTE — TELEPHONE ENCOUNTER
Cindy ALEXANDREI -       Please schedule with Cindy in next available appointment that is not already busy.

## 2025-07-14 NOTE — TELEPHONE ENCOUNTER
Called pt, said that she will be in [place] until Monday. Neither [facility] nor lacey has anything next week and pt said that she has to get surg on the 25th. Pt wanting to know what her options are.

## 2025-07-14 NOTE — TELEPHONE ENCOUNTER
Per Samara's message, pt would need to establish care before we can do pre-op. Since Samara and Cindy will be out of the office next week unfortunately there are no other options except reschedule surgery since she wants to establish with a female provider.    Please call

## 2025-07-15 NOTE — TELEPHONE ENCOUNTER
Pt called in stating she needs a pre-op and needs to est care with office. Pt has est care appt with Dr. Anderson on 7/23/25. Pt is asking if she can get her pre op done the same day as her est care appt. Pt has surgery on Friday the 25th for a DNC. Pt would like a call at [phone].    Pt is aware Dr. Anderson will be her new PCP and expressed comprehension.

## 2025-07-16 NOTE — TELEPHONE ENCOUNTER
Perfectly fine to do pre-op and new pt appointment same visit as I would do a comprehensive review of their medical history and medications for both of these types of visits.     Please reassure her that we will not need to do any invasive exam for her pre-op and if she feels uncomfortable with me as her pcp after meeting with me, it is ok for her to change to Cindy or Samara, I just don't want her to have to reschedule her surgery.